# Patient Record
Sex: FEMALE | URBAN - METROPOLITAN AREA
[De-identification: names, ages, dates, MRNs, and addresses within clinical notes are randomized per-mention and may not be internally consistent; named-entity substitution may affect disease eponyms.]

---

## 2020-03-18 ENCOUNTER — APPOINTMENT (RX ONLY)
Dept: URBAN - METROPOLITAN AREA CLINIC 57 | Facility: CLINIC | Age: 30
Setting detail: DERMATOLOGY
End: 2020-03-18

## 2020-03-18 DIAGNOSIS — L71.8 OTHER ROSACEA: ICD-10-CM

## 2020-03-18 PROCEDURE — ? PRESCRIPTION

## 2020-03-18 PROCEDURE — ? COUNSELING

## 2020-03-18 PROCEDURE — 99202 OFFICE O/P NEW SF 15 MIN: CPT

## 2020-03-18 PROCEDURE — ? TREATMENT REGIMEN

## 2020-03-18 RX ORDER — SULFACETAMIDE SODIUM, SULFUR 98; 48 MG/G; MG/G
LIQUID TOPICAL
Qty: 1 | Refills: 1 | Status: ERX | COMMUNITY
Start: 2020-03-18

## 2020-03-18 RX ADMIN — SULFACETAMIDE SODIUM, SULFUR: 98; 48 LIQUID TOPICAL at 00:00

## 2020-03-18 ASSESSMENT — LOCATION SIMPLE DESCRIPTION DERM
LOCATION SIMPLE: LEFT CHEEK
LOCATION SIMPLE: INFERIOR FOREHEAD
LOCATION SIMPLE: RIGHT CHEEK

## 2020-03-18 ASSESSMENT — LOCATION DETAILED DESCRIPTION DERM
LOCATION DETAILED: INFERIOR MID FOREHEAD
LOCATION DETAILED: LEFT MEDIAL MALAR CHEEK
LOCATION DETAILED: RIGHT CENTRAL MALAR CHEEK
LOCATION DETAILED: LEFT INFERIOR CENTRAL MALAR CHEEK
LOCATION DETAILED: RIGHT LATERAL BUCCAL CHEEK

## 2020-03-18 ASSESSMENT — LOCATION ZONE DERM: LOCATION ZONE: FACE

## 2020-03-18 NOTE — PROCEDURE: TREATMENT REGIMEN
Samples Given: - elta md uv clear
Plan: acne present for 3 yrs \\n- discussed triggers including : caffeine, alcohol, heat and sunlight \\n- hx of H pylori , could not tolerate treatment \\n- previously treated with otc\\n- will proceed with topicals & oral abx
Initiate Treatment: minocycline 50mg : take 1 tab po QD \\nmetro cream : AAA qd \\nsulfa cleanser : cleanse entire face Qd-Bid
Detail Level: Zone